# Patient Record
Sex: FEMALE | HISPANIC OR LATINO | URBAN - METROPOLITAN AREA
[De-identification: names, ages, dates, MRNs, and addresses within clinical notes are randomized per-mention and may not be internally consistent; named-entity substitution may affect disease eponyms.]

---

## 2018-07-12 ENCOUNTER — OFFICE VISIT (OUTPATIENT)
Dept: URBAN - METROPOLITAN AREA CLINIC 11 | Facility: CLINIC | Age: 68
End: 2018-07-12
Payer: COMMERCIAL

## 2018-07-12 PROCEDURE — 99214 OFFICE O/P EST MOD 30 MIN: CPT | Performed by: OPHTHALMOLOGY

## 2018-07-12 RX ORDER — PREDNISOLONE ACETATE 10 MG/ML
1 % SUSPENSION/ DROPS OPHTHALMIC
Qty: 1 | Refills: 0 | Status: INACTIVE
Start: 2018-07-12 | End: 2018-08-22

## 2018-07-12 ASSESSMENT — INTRAOCULAR PRESSURE
OS: 12
OD: 28

## 2018-07-12 NOTE — IMPRESSION/PLAN
Impression: Diagnosis: Primary open-angle glaucoma, bilateral, severe stage. Code: O70.2562. 4/18 OCT 82 (6), 2/18 HVF OD EIA/Sup dep Plan: IOP elevated OD today. IOP goal OD 20 or lower. Recommend pt continue Diamox 250mg BID po. brimonidine OD TID, lumigan QHS OD and Dorzolamide TID OD. Discussed the option of the Micropulse Photocoagulation procedure to help aid w/ Glaucoma. Recommend Micropulse CPC OD. R/B/A of procedure discussed. Pt desires to proceed with procedure.

## 2018-08-22 ENCOUNTER — SURGERY (OUTPATIENT)
Dept: URBAN - METROPOLITAN AREA SURGERY 20 | Facility: SURGERY | Age: 68
End: 2018-08-22
Payer: COMMERCIAL

## 2018-08-22 PROCEDURE — 66710 CILIARY TRANSSLERAL THERAPY: CPT | Performed by: OPHTHALMOLOGY

## 2018-08-22 RX ORDER — PREDNISOLONE ACETATE 10 MG/ML
1 % SUSPENSION/ DROPS OPHTHALMIC
Qty: 1 | Refills: 0 | Status: INACTIVE
Start: 2018-08-22 | End: 2018-08-29

## 2018-08-23 ENCOUNTER — POST-OPERATIVE VISIT (OUTPATIENT)
Dept: URBAN - METROPOLITAN AREA CLINIC 11 | Facility: CLINIC | Age: 68
End: 2018-08-23

## 2018-08-23 PROCEDURE — 99024 POSTOP FOLLOW-UP VISIT: CPT | Performed by: OPTOMETRIST

## 2018-08-23 ASSESSMENT — INTRAOCULAR PRESSURE: OD: 16

## 2018-08-29 ENCOUNTER — POST-OPERATIVE VISIT (OUTPATIENT)
Dept: URBAN - METROPOLITAN AREA CLINIC 11 | Facility: CLINIC | Age: 68
End: 2018-08-29

## 2018-08-29 PROCEDURE — 99024 POSTOP FOLLOW-UP VISIT: CPT | Performed by: OPHTHALMOLOGY

## 2018-08-29 ASSESSMENT — INTRAOCULAR PRESSURE: OD: 16

## 2018-09-26 ENCOUNTER — POST-OPERATIVE VISIT (OUTPATIENT)
Dept: URBAN - METROPOLITAN AREA CLINIC 11 | Facility: CLINIC | Age: 68
End: 2018-09-26

## 2018-09-26 PROCEDURE — 99024 POSTOP FOLLOW-UP VISIT: CPT | Performed by: OPHTHALMOLOGY

## 2018-09-26 RX ORDER — BRIMONIDINE TARTRATE 2 MG/ML
0.2 % SOLUTION/ DROPS OPHTHALMIC
Qty: 15 | Refills: 3 | Status: INACTIVE
Start: 2018-09-26 | End: 2019-05-13

## 2018-09-26 ASSESSMENT — INTRAOCULAR PRESSURE: OD: 14

## 2018-10-26 ENCOUNTER — POST-OPERATIVE VISIT (OUTPATIENT)
Dept: URBAN - METROPOLITAN AREA CLINIC 11 | Facility: CLINIC | Age: 68
End: 2018-10-26
Payer: COMMERCIAL

## 2018-10-26 PROCEDURE — 99024 POSTOP FOLLOW-UP VISIT: CPT | Performed by: OPHTHALMOLOGY

## 2018-10-26 ASSESSMENT — INTRAOCULAR PRESSURE: OD: 19

## 2018-11-13 ENCOUNTER — POST-OPERATIVE VISIT (OUTPATIENT)
Dept: URBAN - METROPOLITAN AREA CLINIC 11 | Facility: CLINIC | Age: 68
End: 2018-11-13

## 2018-11-13 PROCEDURE — 99024 POSTOP FOLLOW-UP VISIT: CPT | Performed by: OPHTHALMOLOGY

## 2018-11-13 ASSESSMENT — INTRAOCULAR PRESSURE: OD: 18

## 2019-02-15 ENCOUNTER — OFFICE VISIT (OUTPATIENT)
Dept: URBAN - METROPOLITAN AREA CLINIC 11 | Facility: CLINIC | Age: 69
End: 2019-02-15
Payer: COMMERCIAL

## 2019-02-15 PROCEDURE — 92012 INTRM OPH EXAM EST PATIENT: CPT | Performed by: OPHTHALMOLOGY

## 2019-02-15 RX ORDER — PREDNISOLONE ACETATE 10 MG/ML
1 % SUSPENSION/ DROPS OPHTHALMIC
Qty: 1 | Refills: 0 | Status: INACTIVE
Start: 2019-02-15 | End: 2020-02-27

## 2019-02-15 ASSESSMENT — INTRAOCULAR PRESSURE: OD: 20

## 2019-02-15 NOTE — IMPRESSION/PLAN
Impression: Primary iridocyclitis, right eye: H20.011. Plan: Discussed diagnosis in detail with patient. Advised patient of condition. Will start Prednisolone BID OD (medication sent to pharmacy) Will monitor closely. 2-3 week follow up with  [facility].

## 2019-02-15 NOTE — IMPRESSION/PLAN
Impression: Diagnosis: Primary open-angle glaucoma, bilateral, severe stage. Code: J97.9326. Goal IOP below 20. 4/18 OCT 82 (6), 2/18 HVF OD EIA/Sup dep Plan: IOP elevated OD. Recommend pt continue Diamox 250mg QD PO. Brimonidine TID OD, Lumigan QHS OD and Dorzolamide TID OD. Will continue to monitor. IOP check in 2 to 3 weeks.

## 2019-03-05 ENCOUNTER — OFFICE VISIT (OUTPATIENT)
Dept: URBAN - METROPOLITAN AREA CLINIC 11 | Facility: CLINIC | Age: 69
End: 2019-03-05
Payer: COMMERCIAL

## 2019-03-05 DIAGNOSIS — H40.1133 PRIMARY OPEN-ANGLE GLAUCOMA, BILATERAL, SEVERE STAGE: Primary | ICD-10-CM

## 2019-03-05 PROCEDURE — 99213 OFFICE O/P EST LOW 20 MIN: CPT | Performed by: OPHTHALMOLOGY

## 2019-03-05 ASSESSMENT — INTRAOCULAR PRESSURE
OS: 8
OD: 28

## 2019-03-05 NOTE — IMPRESSION/PLAN
Impression: Diagnosis: Primary open-angle glaucoma, bilateral, severe stage. Code: G79.5151. Goal IOP below 20. 4/18 OCT 82 (6), 2/18 HVF OD EIA/Sup dep Plan: IOP elevated OD. Recommend pt continue Diamox 250mg QD PO. Brimonidine TID OD, Lumigan QHS OD and Dorzolamide TID OD. Add Rhopressa QHS OD (samples dispensed to patient) Will monitor IOP closely. Discussed repeat Micropulse OD if IOP remains elevated with additional medication. 3-4 week IOP check with Dr Nahun Rod.

## 2019-03-05 NOTE — IMPRESSION/PLAN
Impression: Primary iridocyclitis, right eye: H20.011. Plan: Resolved. D/C Prednisolone. Discussed that Pred may be causing elevation in IOP.

## 2019-04-02 ENCOUNTER — OFFICE VISIT (OUTPATIENT)
Dept: URBAN - METROPOLITAN AREA CLINIC 11 | Facility: CLINIC | Age: 69
End: 2019-04-02
Payer: COMMERCIAL

## 2019-04-02 PROCEDURE — 99213 OFFICE O/P EST LOW 20 MIN: CPT | Performed by: OPHTHALMOLOGY

## 2019-04-02 ASSESSMENT — INTRAOCULAR PRESSURE: OD: 16

## 2019-04-02 NOTE — IMPRESSION/PLAN
Impression: Diagnosis: Primary open-angle glaucoma, bilateral, severe stage. Code: U37.9317. Goal IOP below 20. 4/18 OCT 82 (6), 2/18 HVF OD EIA/Sup dep Plan: IOP improved OD. Recommend pt continue Diamox 250mg QD PO. Brimonidine TID OD, Lumigan QHS OD, Dorzolamide TID OD and Rhopressa QHS OD (medication sent to pharmacy) Will monitor IOP closely. 2 month HVF, RNFL OCT and DE with Dr Cuba Aranda.

## 2019-06-12 ENCOUNTER — TESTING ONLY (OUTPATIENT)
Dept: URBAN - METROPOLITAN AREA CLINIC 11 | Facility: CLINIC | Age: 69
End: 2019-06-12
Payer: COMMERCIAL

## 2019-06-12 PROCEDURE — 92083 EXTENDED VISUAL FIELD XM: CPT | Performed by: OPHTHALMOLOGY

## 2019-07-03 ENCOUNTER — OFFICE VISIT (OUTPATIENT)
Dept: URBAN - METROPOLITAN AREA CLINIC 11 | Facility: CLINIC | Age: 69
End: 2019-07-03
Payer: COMMERCIAL

## 2019-07-03 PROCEDURE — 92014 COMPRE OPH EXAM EST PT 1/>: CPT | Performed by: OPHTHALMOLOGY

## 2019-07-03 PROCEDURE — 92133 CPTRZD OPH DX IMG PST SGM ON: CPT | Performed by: OPHTHALMOLOGY

## 2019-07-03 ASSESSMENT — INTRAOCULAR PRESSURE
OD: 22
OS: 12

## 2019-07-03 NOTE — IMPRESSION/PLAN
Impression: Diagnosis: Primary open-angle glaucoma, bilateral, severe stage. Code: X64.7178. Goal IOP below 20. 4/18 OCT 82 (6), 6/19 HVF OD MANPREET/SD Plan: IOP elevated OD. Discussed treatment options. Due to HVF changes, and monocular patient, will change medication. Recommend patient increase Diamox 250mg to BID PO. D/C  Brimonidine, Lumigan, Dorzolamide  and Rhopressa. Start Simbrinza TID OD and Rocklatan QHS OD (samples dispensed to patient) Discussed repeating Micropulse OD as next step. Patient voiced understanding. 1 month IOP check Dr Adriana Christensen.

## 2019-08-14 ENCOUNTER — OFFICE VISIT (OUTPATIENT)
Dept: URBAN - METROPOLITAN AREA CLINIC 11 | Facility: CLINIC | Age: 69
End: 2019-08-14
Payer: COMMERCIAL

## 2019-08-14 PROCEDURE — 99212 OFFICE O/P EST SF 10 MIN: CPT | Performed by: OPHTHALMOLOGY

## 2019-08-14 RX ORDER — PREDNISOLONE ACETATE 10 MG/ML
1 % SUSPENSION/ DROPS OPHTHALMIC
Qty: 1 | Refills: 0 | Status: INACTIVE
Start: 2019-08-14 | End: 2019-08-29

## 2019-08-14 ASSESSMENT — INTRAOCULAR PRESSURE
OS: 8
OD: 25

## 2019-08-14 NOTE — IMPRESSION/PLAN
Impression: Diagnosis: Primary open-angle glaucoma, bilateral, severe stage. Code: J06.0791. Goal IOP below 20. 4/18 OCT 82 (6), 6/19 HVF OD MANPREET/SD Plan: IOP remains elevated OD. Discussed treatment options. Recommend patient continue Diamox 250mg BID PO, Simbrinza TID OD and Rocklatan QHS OD (samples dispensed to patient)  Discussed the option of the Micropulse G6 Laser procedure to help aid w/ Glaucoma. Recommend Micropulse cytophotocoagulation OD R/B/A of procedure discussed. Pt desires to proceed with procedure.

## 2019-09-18 ENCOUNTER — SURGERY (OUTPATIENT)
Dept: URBAN - METROPOLITAN AREA SURGERY 20 | Facility: SURGERY | Age: 69
End: 2019-09-18
Payer: COMMERCIAL

## 2019-09-18 PROCEDURE — 66710 CILIARY TRANSSLERAL THERAPY: CPT | Performed by: OPHTHALMOLOGY

## 2019-09-19 ENCOUNTER — POST-OPERATIVE VISIT (OUTPATIENT)
Dept: URBAN - METROPOLITAN AREA CLINIC 11 | Facility: CLINIC | Age: 69
End: 2019-09-19

## 2019-09-19 DIAGNOSIS — Z09 ENCNTR FOR F/U EXAM AFT TRTMT FOR COND OTH THAN MALIG NEOPLM: Primary | ICD-10-CM

## 2019-09-19 PROCEDURE — 99024 POSTOP FOLLOW-UP VISIT: CPT | Performed by: OPTOMETRIST

## 2019-09-19 ASSESSMENT — INTRAOCULAR PRESSURE: OD: 12

## 2019-09-25 ENCOUNTER — POST-OPERATIVE VISIT (OUTPATIENT)
Dept: URBAN - METROPOLITAN AREA CLINIC 11 | Facility: CLINIC | Age: 69
End: 2019-09-25

## 2019-09-25 ASSESSMENT — INTRAOCULAR PRESSURE: OD: 17

## 2019-10-23 ENCOUNTER — POST-OPERATIVE VISIT (OUTPATIENT)
Dept: URBAN - METROPOLITAN AREA CLINIC 11 | Facility: CLINIC | Age: 69
End: 2019-10-23

## 2019-10-23 ASSESSMENT — INTRAOCULAR PRESSURE: OD: 13

## 2019-11-12 ENCOUNTER — POST-OPERATIVE VISIT (OUTPATIENT)
Dept: URBAN - METROPOLITAN AREA CLINIC 11 | Facility: CLINIC | Age: 69
End: 2019-11-12

## 2019-11-12 RX ORDER — PREDNISOLONE ACETATE 10 MG/ML
1 % SUSPENSION/ DROPS OPHTHALMIC
Qty: 1 | Refills: 1 | Status: INACTIVE
Start: 2019-11-12 | End: 2020-08-03

## 2019-11-12 ASSESSMENT — INTRAOCULAR PRESSURE: OD: 14

## 2020-02-12 ENCOUNTER — OFFICE VISIT (OUTPATIENT)
Dept: URBAN - METROPOLITAN AREA CLINIC 11 | Facility: CLINIC | Age: 70
End: 2020-02-12
Payer: COMMERCIAL

## 2020-02-12 PROCEDURE — 99213 OFFICE O/P EST LOW 20 MIN: CPT | Performed by: OPHTHALMOLOGY

## 2020-02-12 ASSESSMENT — INTRAOCULAR PRESSURE
OS: 8
OD: 15

## 2020-02-12 NOTE — IMPRESSION/PLAN
Impression: Diagnosis: Primary open-angle glaucoma, bilateral, severe stage. Code: T25.5595. Goal IOP below 20. 4/18 OCT 82 (6), 6/19 HVF OD MANPREET/SD
S/P Micropulse OD Plan: Discussed diagnosis with patient. Recommend patient continue Diamox 250mg QD PO, Dorzolamide TID OD, Brimonidine TID OD and Rocklatan QHS OD. Will continue to monitor.  4 month HVF/RNFL OCT and DE

## 2020-07-21 ENCOUNTER — OFFICE VISIT (OUTPATIENT)
Dept: URBAN - METROPOLITAN AREA CLINIC 11 | Facility: CLINIC | Age: 70
End: 2020-07-21
Payer: MEDICARE

## 2020-07-21 PROCEDURE — 92014 COMPRE OPH EXAM EST PT 1/>: CPT | Performed by: OPHTHALMOLOGY

## 2020-07-21 PROCEDURE — 92133 CPTRZD OPH DX IMG PST SGM ON: CPT | Performed by: OPHTHALMOLOGY

## 2020-07-21 PROCEDURE — 92083 EXTENDED VISUAL FIELD XM: CPT | Performed by: OPHTHALMOLOGY

## 2020-07-21 ASSESSMENT — INTRAOCULAR PRESSURE: OD: 25

## 2020-07-21 NOTE — IMPRESSION/PLAN
Impression: Diagnosis: Primary open-angle glaucoma, bilateral, severe stage. Code: N94.2345. Goal IOP below 20. 7/20 OCT 78 (7), 7/20  HVF OD [place]
S/P Micropulse OD Plan: Discussed diagnosis with patient. HVF and RNFL OCT reviewed with patient. IOP elevated OD, discussed treatment options. Recommend patient continue Diamox 250mg QD PO, Dorzolamide TID OD, Brimonidine TID OD and Rocklatan QHS OD. Discussed the option of the Micropulse G6 Laser procedure to help aid w/ Glaucoma. Recommend Micropulse cytophotocoagulation. R/B/A of procedure discussed. ok for Micropulse G6 OD in ASC, RL1. Pt desires to proceed with procedure.

## 2020-08-10 ENCOUNTER — OFFICE VISIT (OUTPATIENT)
Dept: URBAN - METROPOLITAN AREA CLINIC 11 | Facility: CLINIC | Age: 70
End: 2020-08-10
Payer: MEDICARE

## 2020-08-10 PROCEDURE — 92012 INTRM OPH EXAM EST PATIENT: CPT | Performed by: OPTOMETRIST

## 2020-08-10 ASSESSMENT — INTRAOCULAR PRESSURE
OD: 34
OD: 37

## 2020-08-10 NOTE — IMPRESSION/PLAN
Impression: Diagnosis: Primary open-angle glaucoma, bilateral, severe stage. Code: G97.8331. Goal IOP below 20. 7/20 OCT 78 (7), 7/20  HVF OD [place]
S/P Micropulse OD Plan: IOP elevated OD today. Recommend patient to Increase Diamox 250mg 2 tab BID PO. Continue Dorzolamide TID OD, Brimonidine TID OD and Rocklatan QHS OD (sample dispensed today).  f/u 2 days IOP Check with Dr. Bernardo Calzada

## 2020-08-12 ENCOUNTER — OFFICE VISIT (OUTPATIENT)
Dept: URBAN - METROPOLITAN AREA CLINIC 11 | Facility: CLINIC | Age: 70
End: 2020-08-12
Payer: MEDICARE

## 2020-08-12 PROCEDURE — 99213 OFFICE O/P EST LOW 20 MIN: CPT | Performed by: OPHTHALMOLOGY

## 2020-08-12 RX ORDER — NETARSUDIL AND LATANOPROST OPHTHALMIC SOLUTION, 0.02%/0.005% .2; .05 MG/ML; MG/ML
SOLUTION/ DROPS OPHTHALMIC; TOPICAL
Qty: 1 | Refills: 11 | Status: INACTIVE
Start: 2020-08-12 | End: 2021-04-07

## 2020-08-12 ASSESSMENT — INTRAOCULAR PRESSURE: OD: 12

## 2020-08-12 NOTE — IMPRESSION/PLAN
Impression: Diagnosis: Primary open-angle glaucoma, bilateral, severe stage. Code: O75.7862. Goal IOP below 20. 7/20 OCT 78 (7), 7/20  HVF OD [place]
S/P Micropulse OD Plan: Discussed diagnosis with patient. IOP improved today. Recommend patient continue Diamox 500mg BID PO Dorzolamide TID OD, Brimonidine TID OD and Rocklatan QHS OD (sent to [address] [place] [address]). 1 month IOP check.

## 2020-09-09 ENCOUNTER — OFFICE VISIT (OUTPATIENT)
Dept: URBAN - METROPOLITAN AREA CLINIC 11 | Facility: CLINIC | Age: 70
End: 2020-09-09
Payer: MEDICARE

## 2020-09-09 PROCEDURE — 99213 OFFICE O/P EST LOW 20 MIN: CPT | Performed by: OPHTHALMOLOGY

## 2020-09-09 ASSESSMENT — INTRAOCULAR PRESSURE: OD: 12

## 2020-09-09 NOTE — IMPRESSION/PLAN
Impression: Diagnosis: Primary open-angle glaucoma, bilateral, severe stage. Code: B14.8985. Goal IOP below 20. 7/20 OCT 78 (7), 7/20  HVF OD [place]
S/P Micropulse OD Plan: Discussed diagnosis with patient. IOP stable. Recommend patient continue Diamox 500mg BID PO Dorzolamide TID OD, Brimonidine TID OD and Rocklatan QHS OD. 3 month IOP check.

## 2020-12-15 ENCOUNTER — OFFICE VISIT (OUTPATIENT)
Dept: URBAN - METROPOLITAN AREA CLINIC 45 | Facility: CLINIC | Age: 70
End: 2020-12-15
Payer: MEDICARE

## 2020-12-15 PROCEDURE — 99213 OFFICE O/P EST LOW 20 MIN: CPT | Performed by: OPHTHALMOLOGY

## 2020-12-15 ASSESSMENT — INTRAOCULAR PRESSURE: OD: 12

## 2020-12-15 NOTE — IMPRESSION/PLAN
Impression: Diagnosis: Primary open-angle glaucoma, bilateral, severe stage. Code: S97.2157. Goal IOP below 20. 7/20 OCT 78 (7), 7/20  HVF OD [place]
S/P Micropulse OD Plan: Discussed diagnosis with patient. IOP stable. Recommend patient continue Diamox 500mg BID PO Dorzolamide TID OD, Brimonidine TID OD and Rocklatan QHS OD (samples dispensed to patient) 3 month IOP check.

## 2021-03-30 ENCOUNTER — OFFICE VISIT (OUTPATIENT)
Dept: URBAN - METROPOLITAN AREA CLINIC 11 | Facility: CLINIC | Age: 71
End: 2021-03-30
Payer: MEDICARE

## 2021-03-30 PROCEDURE — 99213 OFFICE O/P EST LOW 20 MIN: CPT | Performed by: OPHTHALMOLOGY

## 2021-03-30 ASSESSMENT — INTRAOCULAR PRESSURE: OD: 12

## 2021-07-28 ENCOUNTER — OFFICE VISIT (OUTPATIENT)
Dept: URBAN - METROPOLITAN AREA CLINIC 11 | Facility: CLINIC | Age: 71
End: 2021-07-28
Payer: MEDICARE

## 2021-07-28 DIAGNOSIS — H20.011 PRIMARY IRIDOCYCLITIS, RIGHT EYE: ICD-10-CM

## 2021-07-28 PROCEDURE — 99213 OFFICE O/P EST LOW 20 MIN: CPT | Performed by: OPHTHALMOLOGY

## 2021-07-28 ASSESSMENT — INTRAOCULAR PRESSURE: OD: 9

## 2021-07-28 NOTE — IMPRESSION/PLAN
Impression: Primary iridocyclitis, right eye: H20.011. Plan: Discussed diagnosis in detail with patient. Advised patient of condition. Discussed treatment options with patient. Start Lotemax QD OD (sample dispensed to patient) Will monitor closely. 2-3 week follow up.

## 2021-07-28 NOTE — IMPRESSION/PLAN
Impression: Diagnosis: Primary open-angle glaucoma, bilateral, severe stage. Code: V39.1555. Goal IOP below 20. 7/20 OCT 78 (7), 7/20  HVF OD [place]
S/P Micropulse OD Plan: Discussed diagnosis with patient. IOP stable. Recommend patient continue Diamox 500mg BID PO, Dorzolamide TID OD, Brimonidine TID OD and Rocklatan QHS OD. HVF and RNFL OCT at next visit.

## 2021-08-03 ENCOUNTER — TESTING ONLY (OUTPATIENT)
Dept: URBAN - METROPOLITAN AREA CLINIC 11 | Facility: CLINIC | Age: 71
End: 2021-08-03
Payer: MEDICARE

## 2021-08-03 PROCEDURE — 92083 EXTENDED VISUAL FIELD XM: CPT | Performed by: OPHTHALMOLOGY

## 2021-08-17 ENCOUNTER — OFFICE VISIT (OUTPATIENT)
Dept: URBAN - METROPOLITAN AREA CLINIC 11 | Facility: CLINIC | Age: 71
End: 2021-08-17
Payer: MEDICARE

## 2021-08-17 PROCEDURE — 92133 CPTRZD OPH DX IMG PST SGM ON: CPT | Performed by: OPHTHALMOLOGY

## 2021-08-17 PROCEDURE — 99213 OFFICE O/P EST LOW 20 MIN: CPT | Performed by: OPHTHALMOLOGY

## 2021-08-17 ASSESSMENT — INTRAOCULAR PRESSURE
OD: 16
OS: 10

## 2021-08-17 NOTE — IMPRESSION/PLAN
Impression: Primary iridocyclitis, right eye: H20.011. Plan: Discussed diagnosis in detail with patient. Advised patient of condition. Improving. Recommend patient continue Lotemax QD OD until bottle runs out.

## 2021-08-17 NOTE — IMPRESSION/PLAN
Impression: Diagnosis: Primary open-angle glaucoma, bilateral, severe stage. Code: R81.0310. Goal IOP below 20. 8/21 OCT 86/- 5/-, 8/21 HVF OD S/IA 
S/P Micropulse OD Plan: Discussed diagnosis with patient. HVF and RNFL OCT reviewed with patient. Recommend patient continue Diamox 500mg BID PO, Dorzolamide TID OD, Brimonidine TID OD and Rocklatan QHS OD. 3 month IOP check.

## 2021-11-17 ENCOUNTER — OFFICE VISIT (OUTPATIENT)
Dept: URBAN - METROPOLITAN AREA CLINIC 11 | Facility: CLINIC | Age: 71
End: 2021-11-17
Payer: MEDICARE

## 2021-11-17 PROCEDURE — 99213 OFFICE O/P EST LOW 20 MIN: CPT | Performed by: OPHTHALMOLOGY

## 2021-11-17 RX ORDER — NEOMYCIN, POLYMYXIN B SULFATES, DEXAMETHASONE 1; 3.5; 1 MG/G; MG/G; [USP'U]/G
OINTMENT OPHTHALMIC
Qty: 1 | Refills: 1 | Status: INACTIVE
Start: 2021-11-17 | End: 2022-01-04

## 2021-11-17 ASSESSMENT — INTRAOCULAR PRESSURE: OD: 17

## 2021-11-17 NOTE — IMPRESSION/PLAN
Impression: Diagnosis: Primary open-angle glaucoma, bilateral, severe stage. Code: Q59.9539. Goal IOP below 20. 8/21 OCT 86/- 5/-, 8/21 HVF OD S/IA 
S/P Micropulse OD Plan: Discussed diagnosis with patient. IOP stable. Recommend patient continue Diamox 500mg BID PO, Dorzolamide TID OD, Brimonidine TID OD and Rocklatan QHS OD. Will continue to monitor.

## 2021-11-17 NOTE — IMPRESSION/PLAN
Impression: Primary iridocyclitis, right eye: H20.011. Plan: Discussed diagnosis in detail with patient. Advised patient of condition. Start Johnnie/poly/dex TOMAS TID OD (sent medication to pharmacy) dispensed sample of gtts for patient to start if TOMAS is to expensive. Will continue to monitor closely. 2 week follow up.

## 2021-12-07 ENCOUNTER — OFFICE VISIT (OUTPATIENT)
Dept: URBAN - METROPOLITAN AREA CLINIC 11 | Facility: CLINIC | Age: 71
End: 2021-12-07
Payer: MEDICARE

## 2021-12-07 PROCEDURE — 99213 OFFICE O/P EST LOW 20 MIN: CPT | Performed by: OPHTHALMOLOGY

## 2021-12-07 ASSESSMENT — INTRAOCULAR PRESSURE: OD: 12

## 2021-12-07 NOTE — IMPRESSION/PLAN
Impression: Primary iridocyclitis, right eye: H20.011. Improving Plan: Discussed diagnosis in detail with patient. Condition improving. Recommend patient continue Johnnie/poly/dex TOMAS QHS OD and Johnnie/poly/dex gtts QD OD. Will continue to monitor closely. 1 month follow up.

## 2021-12-07 NOTE — IMPRESSION/PLAN
Impression: Diagnosis: Primary open-angle glaucoma, bilateral, severe stage. Code: T75.7370. Goal IOP below 20. 8/21 OCT 86/- 5/-, 8/21 HVF OD S/IA 
S/P Micropulse OD Plan: Discussed diagnosis with patient. IOP stable. Recommend patient continue Diamox 500mg BID PO, Dorzolamide TID OD, Brimonidine TID OD and Rocklatan QHS OD. Will continue to monitor. Patient brought in appeal letter, if unable to obtain approval ok to split Rocklatan (Rhopressa/Latanoprost) Will continue to monitor. 1 month IOP check.

## 2022-01-04 ENCOUNTER — OFFICE VISIT (OUTPATIENT)
Dept: URBAN - METROPOLITAN AREA CLINIC 11 | Facility: CLINIC | Age: 72
End: 2022-01-04
Payer: MEDICARE

## 2022-01-04 PROCEDURE — 99213 OFFICE O/P EST LOW 20 MIN: CPT | Performed by: OPHTHALMOLOGY

## 2022-01-04 RX ORDER — NEOMYCIN, POLYMYXIN B SULFATES, DEXAMETHASONE 1; 3.5; 1 MG/G; MG/G; [USP'U]/G
OINTMENT OPHTHALMIC
Qty: 1 | Refills: 5 | Status: ACTIVE
Start: 2022-01-04

## 2022-01-04 ASSESSMENT — INTRAOCULAR PRESSURE: OD: 16

## 2022-01-04 NOTE — IMPRESSION/PLAN
Impression: Diagnosis: Primary open-angle glaucoma, bilateral, severe stage. Code: M05.1149. Goal IOP below 20. 8/21 OCT 86/- 5/-, 8/21 HVF OD S/IA 
S/P Micropulse OD Plan: Discussed diagnosis with patient. IOP stable.  Recommend patient continue Diamox 500mg BID PO, Dorzolamide TID OD, Brimonidine TID OD and stop Rocklatan QHS OD due to insurance and start latanoprost QHS OD.

## 2022-01-04 NOTE — IMPRESSION/PLAN
Impression: Primary iridocyclitis, right eye: H20.011. Improving Plan: Discussed diagnosis in detail with patient. Condition resolved. Recommend patient continue Johnnie/poly/dex TOMAS QHS OD and stop  Johnnie/poly/dex gtts QD OD. Will continue to monitor closely. 2 month follow up.

## 2022-03-01 ENCOUNTER — OFFICE VISIT (OUTPATIENT)
Dept: URBAN - METROPOLITAN AREA CLINIC 11 | Facility: CLINIC | Age: 72
End: 2022-03-01
Payer: MEDICARE

## 2022-03-01 PROCEDURE — 99213 OFFICE O/P EST LOW 20 MIN: CPT | Performed by: OPHTHALMOLOGY

## 2022-03-01 ASSESSMENT — INTRAOCULAR PRESSURE: OD: 21

## 2022-03-01 NOTE — IMPRESSION/PLAN
Impression: Diagnosis: Primary open-angle glaucoma, bilateral, severe stage. Code: F91.2891. Goal IOP below 20. 8/21 OCT 86/- 5/-, 8/21 HVF OD S/IA 
S/P Micropulse OD Plan: Discussed diagnosis with patient. IOP stable. Recommend patient continue Diamox 500mg BID PO, Dorzolamide TID OD, Brimonidine TID OD and Latanoprost QHS OD. Will continue to monitor.  4 month HVF/DE and RNFL OCT

## 2022-03-01 NOTE — IMPRESSION/PLAN
Impression: Primary iridocyclitis, right eye: H20.011. Improving Plan: Discussed diagnosis in detail with patient. Condition resolved. Recommend patient d/c Johnnie/poly/dex. Will monitor.

## 2022-07-12 ENCOUNTER — TESTING ONLY (OUTPATIENT)
Facility: LOCATION | Age: 72
End: 2022-07-12
Payer: MEDICARE

## 2022-07-12 DIAGNOSIS — H40.1133 PRIMARY OPEN-ANGLE GLAUCOMA, BILATERAL, SEVERE STAGE: Primary | ICD-10-CM

## 2022-07-12 PROCEDURE — 92083 EXTENDED VISUAL FIELD XM: CPT | Performed by: OPHTHALMOLOGY

## 2022-07-13 ENCOUNTER — OFFICE VISIT (OUTPATIENT)
Facility: LOCATION | Age: 72
End: 2022-07-13
Payer: MEDICARE

## 2022-07-13 DIAGNOSIS — H20.011 PRIMARY IRIDOCYCLITIS, RIGHT EYE: ICD-10-CM

## 2022-07-13 DIAGNOSIS — H02.102 UNSPECIFIED ECTROPION OF RIGHT LOWER EYELID: ICD-10-CM

## 2022-07-13 PROCEDURE — 99214 OFFICE O/P EST MOD 30 MIN: CPT | Performed by: OPHTHALMOLOGY

## 2022-07-13 ASSESSMENT — INTRAOCULAR PRESSURE: OD: 19

## 2022-07-13 NOTE — IMPRESSION/PLAN
Impression: Diagnosis: Primary open-angle glaucoma, bilateral, severe stage. Code: W92.8272. Goal IOP below 20. 7/22 OCT 73/- 4/-, 7/22 HVF OD [place]/SD 
S/P Micropulse OD Plan: Discussed diagnosis with patient. HVF reviewed with patient. Recommend patient continue Diamox 500mg BID PO, Dorzolamide TID OD, Brimonidine TID OD and Latanoprost QHS OD.  Will continue to monitor

## 2022-07-13 NOTE — IMPRESSION/PLAN
Impression: Primary iridocyclitis, right eye: H20.011. Plan: Discussed diagnosis in detail with patient. Start Lotemax TID OD (samples dispensed to patient). Will monitor closely. 1 week follow up.

## 2022-07-13 NOTE — IMPRESSION/PLAN
Impression: Unspecified ectropion of right lower eyelid: H02.102. Plan: Discussed diagnosis with patient. Facial droop in R side. Recommend evaluation today with PCP for CVA, if unable recommend visit to ER today.  Patient voiced understanding

## 2022-07-19 ENCOUNTER — OFFICE VISIT (OUTPATIENT)
Facility: LOCATION | Age: 72
End: 2022-07-19
Payer: MEDICARE

## 2022-07-19 DIAGNOSIS — H20.011 PRIMARY IRIDOCYCLITIS, RIGHT EYE: ICD-10-CM

## 2022-07-19 DIAGNOSIS — H40.1133 PRIMARY OPEN-ANGLE GLAUCOMA, BILATERAL, SEVERE STAGE: Primary | ICD-10-CM

## 2022-07-19 PROCEDURE — 99213 OFFICE O/P EST LOW 20 MIN: CPT | Performed by: OPHTHALMOLOGY

## 2022-07-19 RX ORDER — PREDNISOLONE ACETATE 10 MG/ML
1 % SUSPENSION/ DROPS OPHTHALMIC
Qty: 10 | Refills: 3 | Status: ACTIVE
Start: 2022-07-19

## 2022-07-19 ASSESSMENT — INTRAOCULAR PRESSURE: OD: 18

## 2022-07-19 NOTE — IMPRESSION/PLAN
Impression: Primary iridocyclitis, right eye: H20.011. Plan: Discussed diagnosis in detail with patient. Decrease Lotemax to BID OD (sample dispensed) Use until bottle runs out, then start Prednisolone BID OD (medication sent to pharmacy) Will continue to monitor. 1 month follow up.

## 2022-07-19 NOTE — IMPRESSION/PLAN
Impression: Diagnosis: Primary open-angle glaucoma, bilateral, severe stage. Code: K83.5329. Goal IOP below 20. 7/22 OCT 73/- 4/-, 7/22 HVF OD [place]/SD 
S/P Micropulse OD Plan: Discussed diagnosis with patient. Recommend patient continue Diamox 500mg BID PO, Dorzolamide TID OD, Brimonidine TID OD and Latanoprost QHS OD. Will continue to monitor.

## 2022-08-29 ENCOUNTER — OFFICE VISIT (OUTPATIENT)
Facility: LOCATION | Age: 72
End: 2022-08-29
Payer: MEDICARE

## 2022-08-29 DIAGNOSIS — H40.1133 PRIMARY OPEN-ANGLE GLAUCOMA, BILATERAL, SEVERE STAGE: Primary | ICD-10-CM

## 2022-08-29 DIAGNOSIS — H20.011 PRIMARY IRIDOCYCLITIS, RIGHT EYE: ICD-10-CM

## 2022-08-29 DIAGNOSIS — G45.3 AMAUROSIS FUGAX: ICD-10-CM

## 2022-08-29 PROCEDURE — 99213 OFFICE O/P EST LOW 20 MIN: CPT | Performed by: OPHTHALMOLOGY

## 2022-08-29 ASSESSMENT — INTRAOCULAR PRESSURE: OD: 20

## 2022-08-29 NOTE — IMPRESSION/PLAN
Impression: Primary iridocyclitis, right eye: H20.011. Plan: Discussed diagnosis in detail with patient. Continue Prednisolone BID OD. Will continue to monitor. 1 month follow up.

## 2022-08-29 NOTE — IMPRESSION/PLAN
Impression: Amaurosis fugax: G45.3. Plan: Discussed diagnosis in detail with patient. Advised patient of condition. Recommend 2D Echo and Carotid ultrasound.  Note to PCP

## 2022-09-20 ENCOUNTER — OFFICE VISIT (OUTPATIENT)
Dept: URBAN - METROPOLITAN AREA CLINIC 32 | Facility: CLINIC | Age: 72
End: 2022-09-20
Payer: MEDICARE

## 2022-09-20 DIAGNOSIS — H52.4 PRESBYOPIA: ICD-10-CM

## 2022-09-20 DIAGNOSIS — H40.1133 PRIMARY OPEN-ANGLE GLAUCOMA, BILATERAL, SEVERE STAGE: Primary | ICD-10-CM

## 2022-09-20 PROCEDURE — 99214 OFFICE O/P EST MOD 30 MIN: CPT | Performed by: OPTOMETRIST

## 2022-09-20 ASSESSMENT — VISUAL ACUITY
OD: 20/40
OS: NLP

## 2022-09-20 ASSESSMENT — INTRAOCULAR PRESSURE: OD: 17

## 2022-09-20 NOTE — IMPRESSION/PLAN
Impression: Diagnosis: Primary open-angle glaucoma, bilateral, severe stage. Code: R39.0200. Goal IOP below 20. 7/22 OCT 73/- 4/-, 7/22 HVF OD [place] 
S/P Micropulse OD Plan: Continue with Dr Lori ambrosio add bifocal Rx Recommend: Andry 4x Hand Magnifier, Andry Smart [employer], JACI Lights(often available at [employer] or [employer]), Large TV 65'' to 82,'' Audible Books, MaxTV/Max Detail Discussed: ORCAM, iPhone/iPad, Audible Assistants([employer] Gracie/Google Home) Discussed diagnosis with patient. Recommend patient continue Diamox 500mg BID PO, Dorzolamide TID OD, Brimonidine TID OD and Latanoprost QHS OD. Will continue to monitor.

## 2022-10-11 ENCOUNTER — OFFICE VISIT (OUTPATIENT)
Facility: LOCATION | Age: 72
End: 2022-10-11
Payer: MEDICARE

## 2022-10-11 DIAGNOSIS — H20.011 PRIMARY IRIDOCYCLITIS, RIGHT EYE: ICD-10-CM

## 2022-10-11 DIAGNOSIS — H40.1133 PRIMARY OPEN-ANGLE GLAUCOMA, BILATERAL, SEVERE STAGE: Primary | ICD-10-CM

## 2022-10-11 PROCEDURE — 99213 OFFICE O/P EST LOW 20 MIN: CPT | Performed by: OPHTHALMOLOGY

## 2022-10-11 ASSESSMENT — INTRAOCULAR PRESSURE: OD: 18

## 2022-10-11 NOTE — IMPRESSION/PLAN
Impression: Diagnosis: Primary open-angle glaucoma, bilateral, severe stage. Code: E39.8383. Goal IOP below 20. 7/22 OCT 73/- 4/-, 7/22 HVF OD [place] 
S/P Micropulse OD Plan: Discussed diagnosis with patient. Recommend patient continue Diamox 500mg BID PO, Dorzolamide TID OD, Brimonidine TID OD and Latanoprost QHS OD. Will continue to monitor. 4-6 week IOP check.

## 2022-10-11 NOTE — IMPRESSION/PLAN
Impression: Primary iridocyclitis, right eye: H20.011. Plan: Discussed diagnosis in detail with patient. Decrease Prednisolone to QD OD. Will continue to monitor. 4-6 week follow up.

## 2022-11-21 ENCOUNTER — OFFICE VISIT (OUTPATIENT)
Facility: LOCATION | Age: 72
End: 2022-11-21
Payer: MEDICARE

## 2022-11-21 DIAGNOSIS — H40.1133 PRIMARY OPEN-ANGLE GLAUCOMA, BILATERAL, SEVERE STAGE: Primary | ICD-10-CM

## 2022-11-21 PROCEDURE — 99214 OFFICE O/P EST MOD 30 MIN: CPT | Performed by: OPHTHALMOLOGY

## 2022-11-21 RX ORDER — PREDNISOLONE ACETATE 10 MG/ML
1 % SUSPENSION/ DROPS OPHTHALMIC
Qty: 1 | Refills: 0 | Status: ACTIVE
Start: 2022-11-21

## 2022-11-21 RX ORDER — LATANOPROST 50 UG/ML
0.005 % SOLUTION OPHTHALMIC
Qty: 7.5 | Refills: 3 | Status: ACTIVE
Start: 2022-11-21

## 2022-11-21 ASSESSMENT — INTRAOCULAR PRESSURE
OD: 25
OD: 26

## 2022-11-21 NOTE — IMPRESSION/PLAN
Impression: Diagnosis: Primary open-angle glaucoma, bilateral, severe stage. Code: Z26.0351. Goal IOP below 20. 7/22 OCT 73/- 4/-, 7/22 HVF OD [place]/SD 
S/P Micropulse OD Plan: Discussed diagnosis with patient. IOP elevated. Discussed treatment options. Recommend patient continue Diamox 500mg BID PO, Dorzolamide TID OD, Brimonidine TID OD and Latanoprost QHS OD. Discussed the option of the Micropulse G6 Laser procedure to help aid w/ Glaucoma. Recommend Micropulse cytophotocoagulation. R/B/A of procedure discussed. ok for Micropulse G6 OD in ASC, RL1. Pt desires to proceed with procedure.

## 2023-01-13 ENCOUNTER — POST-OPERATIVE VISIT (OUTPATIENT)
Facility: LOCATION | Age: 73
End: 2023-01-13
Payer: MEDICARE

## 2023-01-13 DIAGNOSIS — Z48.810 ENCOUNTER FOR SURGICAL AFTERCARE FOLLOWING SURGERY ON A SENSE ORGAN: Primary | ICD-10-CM

## 2023-01-13 PROCEDURE — 99024 POSTOP FOLLOW-UP VISIT: CPT

## 2023-01-13 ASSESSMENT — INTRAOCULAR PRESSURE: OD: 17

## 2023-01-13 NOTE — IMPRESSION/PLAN
Impression: S/P Diode Micropulse Laser Cyclophotocoagulation OD - 1 Day. Encounter for surgical aftercare following surgery on a sense organ  Z48.810. Excellent post op course   Post operative instructions reviewed - Condition is improving - Plan: Patient presents for one day post op. Patient advised they are healing well from surgery. Patient was also advised to keep appointment in one week for follow up. Patient to call office with any increased pain or decreased vision.  --Continue Prednisolone acetate 1% QID x1 week

## 2023-01-25 ENCOUNTER — POST-OPERATIVE VISIT (OUTPATIENT)
Facility: LOCATION | Age: 73
End: 2023-01-25
Payer: MEDICARE

## 2023-01-25 DIAGNOSIS — Z48.810 ENCOUNTER FOR SURGICAL AFTERCARE FOLLOWING SURGERY ON A SENSE ORGAN: Primary | ICD-10-CM

## 2023-01-25 PROCEDURE — 99024 POSTOP FOLLOW-UP VISIT: CPT | Performed by: OPHTHALMOLOGY

## 2023-01-25 RX ORDER — PREDNISOLONE ACETATE 10 MG/ML
1 % SUSPENSION/ DROPS OPHTHALMIC
Qty: 10 | Refills: 1 | Status: ACTIVE
Start: 2023-01-25

## 2023-01-25 ASSESSMENT — INTRAOCULAR PRESSURE: OD: 15

## 2023-01-25 NOTE — IMPRESSION/PLAN
Impression: S/P Diode Micropulse Laser Cyclophotocoagulation OD - 13 Days. Encounter for surgical aftercare following surgery on a sense organ  Z48.810. Post operative instructions reviewed -medication reviewed Plan: 3 week post op Latanoprost QHS OS, Brimonidine TID OD, Dorzolamide TID OD and Acetazolamide 500mg PO BID Taper Prednisolone TID x 1 wk, BID x 1 wk, QD x 1 wk then d/c

## 2023-02-11 NOTE — IMPRESSION/PLAN
Impression: Diagnosis: Primary open-angle glaucoma, bilateral, severe stage. Code: Q22.7912. Goal IOP below 20. 7/20 OCT 78 (7), 7/20  HVF OD [place]
S/P Micropulse OD Plan: Discussed diagnosis with patient. IOP stable. Recommend patient continue Diamox 500mg BID PO Dorzolamide TID OD, Brimonidine TID OD and Rocklatan QHS OD. 4 month IOP check. Attending Only

## 2023-02-13 ENCOUNTER — POST-OPERATIVE VISIT (OUTPATIENT)
Facility: LOCATION | Age: 73
End: 2023-02-13
Payer: MEDICARE

## 2023-02-13 DIAGNOSIS — Z48.810 ENCOUNTER FOR SURGICAL AFTERCARE FOLLOWING SURGERY ON A SENSE ORGAN: Primary | ICD-10-CM

## 2023-02-13 PROCEDURE — 99024 POSTOP FOLLOW-UP VISIT: CPT | Performed by: OPHTHALMOLOGY

## 2023-02-13 ASSESSMENT — INTRAOCULAR PRESSURE: OD: 14

## 2023-02-13 NOTE — IMPRESSION/PLAN
Impression: S/P Micropulse Laser OD - 32 Days. Encounter for surgical aftercare following surgery on a sense organ  Z48.810. Post operative instructions reviewed -medication reviewed Plan: Will continue to monitor. 1 month IOP check/post op Prednisolone QD, discussed if pain increases ok to instill BID Continue Brimonidine TID OD, Dorzolamide/Timolol TID OD, Latanoprost QHS OD and Acetazolamide 500mg PO BID

## 2023-05-08 ENCOUNTER — POST-OPERATIVE VISIT (OUTPATIENT)
Facility: LOCATION | Age: 73
End: 2023-05-08
Payer: MEDICARE

## 2023-05-08 DIAGNOSIS — Z48.810 ENCOUNTER FOR SURGICAL AFTERCARE FOLLOWING SURGERY ON A SENSE ORGAN: Primary | ICD-10-CM

## 2023-05-08 PROCEDURE — 99024 POSTOP FOLLOW-UP VISIT: CPT | Performed by: OPHTHALMOLOGY

## 2023-05-08 RX ORDER — PREDNISOLONE ACETATE 10 MG/ML
1 % SUSPENSION/ DROPS OPHTHALMIC
Qty: 5 | Refills: 0 | Status: ACTIVE
Start: 2023-05-08

## 2023-05-08 RX ORDER — DORZOLAMIDE HYDROCHLORIDE AND TIMOLOL MALEATE 20; 5 MG/ML; MG/ML
SOLUTION/ DROPS OPHTHALMIC
Qty: 15 | Refills: 3 | Status: ACTIVE
Start: 2023-05-08

## 2023-05-08 ASSESSMENT — INTRAOCULAR PRESSURE
OS: 10
OD: 10

## 2023-05-08 NOTE — IMPRESSION/PLAN
Impression: S/P Micropulse Laser OD - 116 Days. Encounter for surgical aftercare following surgery on a sense organ  Z48.810. Plan: IOP improved. Inflammation present. Re start PRED qday OD. Continue Brimonidine tid OD, Cosopt tid OD, Latanoprost qhs OD and Acteazolamide bid PO. Re check 4-6 weeks.

## 2023-06-20 ENCOUNTER — OFFICE VISIT (OUTPATIENT)
Facility: LOCATION | Age: 73
End: 2023-06-20
Payer: MEDICARE

## 2023-06-20 DIAGNOSIS — Z48.810 ENCOUNTER FOR SURGICAL AFTERCARE FOLLOWING SURGERY ON A SENSE ORGAN: Primary | ICD-10-CM

## 2023-06-20 PROCEDURE — 99213 OFFICE O/P EST LOW 20 MIN: CPT | Performed by: OPHTHALMOLOGY

## 2023-06-20 RX ORDER — ACETAZOLAMIDE 250 MG/1
250 MG TABLET ORAL
Qty: 120 | Refills: 0 | Status: ACTIVE
Start: 2023-06-20

## 2023-06-20 ASSESSMENT — INTRAOCULAR PRESSURE: OD: 10

## 2023-06-20 NOTE — IMPRESSION/PLAN
Impression: S/P Micropulse Laser OD - 116 Days. Encounter for surgical aftercare following surgery on a sense organ  Z48.810. Plan: IOP  holding  well  . Continue Brimonidine tid OD, Cosopt tid OD, Latanoprost qhs OD and Acetazolamide bid PO. 
Dilate, iop check, vft  2-3 months

## 2023-09-20 ENCOUNTER — OFFICE VISIT (OUTPATIENT)
Facility: LOCATION | Age: 73
End: 2023-09-20
Payer: MEDICARE

## 2023-09-20 DIAGNOSIS — H40.1133 PRIMARY OPEN-ANGLE GLAUCOMA, BILATERAL, SEVERE STAGE: Primary | ICD-10-CM

## 2023-09-20 DIAGNOSIS — Z48.810 ENCOUNTER FOR SURGICAL AFTERCARE FOLLOWING SURGERY ON A SENSE ORGAN: ICD-10-CM

## 2023-09-20 PROCEDURE — 99213 OFFICE O/P EST LOW 20 MIN: CPT | Performed by: OPHTHALMOLOGY

## 2023-09-20 PROCEDURE — 92083 EXTENDED VISUAL FIELD XM: CPT | Performed by: OPHTHALMOLOGY

## 2023-09-20 ASSESSMENT — INTRAOCULAR PRESSURE: OD: 15

## 2024-01-10 ENCOUNTER — OFFICE VISIT (OUTPATIENT)
Facility: LOCATION | Age: 74
End: 2024-01-10
Payer: MEDICARE

## 2024-01-10 DIAGNOSIS — H40.1133 PRIMARY OPEN-ANGLE GLAUCOMA, BILATERAL, SEVERE STAGE: Primary | ICD-10-CM

## 2024-01-10 PROCEDURE — 99213 OFFICE O/P EST LOW 20 MIN: CPT | Performed by: OPHTHALMOLOGY

## 2024-01-10 RX ORDER — BRIMONIDINE TARTRATE 2 MG/ML
0.2 % SOLUTION/ DROPS OPHTHALMIC
Qty: 15 | Refills: 5 | Status: ACTIVE
Start: 2024-01-10

## 2024-01-10 RX ORDER — LATANOPROST 50 UG/ML
0.005 % SOLUTION OPHTHALMIC
Qty: 7.5 | Refills: 3 | Status: ACTIVE
Start: 2024-01-10

## 2024-01-10 RX ORDER — ACETAZOLAMIDE 250 MG/1
250 MG TABLET ORAL
Qty: 120 | Refills: 0 | Status: ACTIVE
Start: 2024-01-10

## 2024-01-10 RX ORDER — DORZOLAMIDE HYDROCHLORIDE AND TIMOLOL MALEATE 20; 5 MG/ML; MG/ML
SOLUTION/ DROPS OPHTHALMIC
Qty: 15 | Refills: 3 | Status: ACTIVE
Start: 2024-01-10

## 2024-01-10 RX ORDER — PREDNISOLONE ACETATE 10 MG/ML
1 % SUSPENSION/ DROPS OPHTHALMIC
Qty: 5 | Refills: 0 | Status: ACTIVE
Start: 2024-01-10

## 2024-01-10 ASSESSMENT — INTRAOCULAR PRESSURE
OS: 10
OD: 13

## 2024-04-17 ENCOUNTER — OFFICE VISIT (OUTPATIENT)
Facility: LOCATION | Age: 74
End: 2024-04-17
Payer: MEDICARE

## 2024-04-17 DIAGNOSIS — H40.1133 PRIMARY OPEN-ANGLE GLAUCOMA, BILATERAL, SEVERE STAGE: Primary | ICD-10-CM

## 2024-04-17 PROCEDURE — 99213 OFFICE O/P EST LOW 20 MIN: CPT | Performed by: OPHTHALMOLOGY

## 2024-04-17 ASSESSMENT — INTRAOCULAR PRESSURE: OD: 14

## 2024-08-21 ENCOUNTER — OFFICE VISIT (OUTPATIENT)
Facility: LOCATION | Age: 74
End: 2024-08-21
Payer: MEDICARE

## 2024-08-21 DIAGNOSIS — H04.123 DRY EYE SYNDROME OF BILATERAL LACRIMAL GLANDS: ICD-10-CM

## 2024-08-21 DIAGNOSIS — H40.1133 PRIMARY OPEN-ANGLE GLAUCOMA, BILATERAL, SEVERE STAGE: Primary | ICD-10-CM

## 2024-08-21 PROCEDURE — 99213 OFFICE O/P EST LOW 20 MIN: CPT | Performed by: OPHTHALMOLOGY

## 2024-08-21 RX ORDER — ACETAZOLAMIDE 250 MG/1
250 MG TABLET ORAL
Qty: 120 | Refills: 5 | Status: ACTIVE
Start: 2024-08-21

## 2024-08-21 ASSESSMENT — INTRAOCULAR PRESSURE: OD: 14

## 2024-10-04 ENCOUNTER — OFFICE VISIT (OUTPATIENT)
Facility: LOCATION | Age: 74
End: 2024-10-04
Payer: MEDICARE

## 2024-10-04 DIAGNOSIS — H16.001 CORNEAL ULCER OF RIGHT EYE: Primary | ICD-10-CM

## 2024-10-04 PROCEDURE — 99214 OFFICE O/P EST MOD 30 MIN: CPT

## 2024-10-04 RX ORDER — GENTAMICIN SULFATE 3 MG/ML
0.3 % SOLUTION/ DROPS OPHTHALMIC
Qty: 5 | Refills: 0 | Status: ACTIVE
Start: 2024-10-04

## 2024-10-04 ASSESSMENT — VISUAL ACUITY: OD: 20/150

## 2024-10-04 ASSESSMENT — INTRAOCULAR PRESSURE: OD: 12

## 2024-10-08 ENCOUNTER — OFFICE VISIT (OUTPATIENT)
Dept: URBAN - METROPOLITAN AREA CLINIC 32 | Facility: CLINIC | Age: 74
End: 2024-10-08
Payer: MEDICARE

## 2024-10-08 DIAGNOSIS — H40.1133 PRIMARY OPEN-ANGLE GLAUCOMA, BILATERAL, SEVERE STAGE: ICD-10-CM

## 2024-10-08 PROCEDURE — 99213 OFFICE O/P EST LOW 20 MIN: CPT | Performed by: OPHTHALMOLOGY

## 2024-10-08 RX ORDER — OFLOXACIN 3 MG/ML
0.3 % SOLUTION/ DROPS OPHTHALMIC
Qty: 5 | Refills: 3 | Status: ACTIVE
Start: 2024-10-08

## 2024-10-08 RX ORDER — GENTAMICIN SULFATE 3 MG/ML
0.3 % SOLUTION/ DROPS OPHTHALMIC
Qty: 5 | Refills: 6 | Status: ACTIVE
Start: 2024-10-08

## 2024-10-11 ENCOUNTER — OFFICE VISIT (OUTPATIENT)
Dept: URBAN - METROPOLITAN AREA CLINIC 33 | Facility: CLINIC | Age: 74
End: 2024-10-11
Payer: MEDICARE

## 2024-10-11 DIAGNOSIS — H16.001 CORNEAL ULCER OF RIGHT EYE: Primary | ICD-10-CM

## 2024-10-11 PROCEDURE — 99213 OFFICE O/P EST LOW 20 MIN: CPT | Performed by: OPHTHALMOLOGY

## 2024-10-11 RX ORDER — PREDNISOLONE ACETATE 10 MG/ML
1 % SUSPENSION/ DROPS OPHTHALMIC
Qty: 10 | Refills: 1 | Status: ACTIVE
Start: 2024-10-11

## 2024-10-11 ASSESSMENT — INTRAOCULAR PRESSURE
OS: 10
OD: 8

## 2024-10-16 ENCOUNTER — OFFICE VISIT (OUTPATIENT)
Dept: URBAN - METROPOLITAN AREA CLINIC 33 | Facility: CLINIC | Age: 74
End: 2024-10-16
Payer: MEDICARE

## 2024-10-16 DIAGNOSIS — H16.001 CORNEAL ULCER OF RIGHT EYE: ICD-10-CM

## 2024-10-16 DIAGNOSIS — H40.1133 PRIMARY OPEN-ANGLE GLAUCOMA, BILATERAL, SEVERE STAGE: Primary | ICD-10-CM

## 2024-10-16 PROCEDURE — 99213 OFFICE O/P EST LOW 20 MIN: CPT | Performed by: OPHTHALMOLOGY

## 2024-10-16 RX ORDER — PREDNISOLONE ACETATE 10 MG/ML
1 % SUSPENSION/ DROPS OPHTHALMIC
Qty: 10 | Refills: 5 | Status: ACTIVE
Start: 2024-10-16

## 2024-10-16 RX ORDER — GENTAMICIN SULFATE 3 MG/ML
0.3 % SOLUTION/ DROPS OPHTHALMIC
Qty: 5 | Refills: 6 | Status: ACTIVE
Start: 2024-10-16

## 2024-10-16 RX ORDER — OFLOXACIN 3 MG/ML
0.3 % SOLUTION/ DROPS OPHTHALMIC
Qty: 5 | Refills: 5 | Status: ACTIVE
Start: 2024-10-16

## 2024-10-16 ASSESSMENT — INTRAOCULAR PRESSURE
OS: 3
OD: 7

## 2024-12-06 ENCOUNTER — OFFICE VISIT (OUTPATIENT)
Dept: URBAN - METROPOLITAN AREA CLINIC 33 | Facility: CLINIC | Age: 74
End: 2024-12-06
Payer: MEDICARE

## 2024-12-06 DIAGNOSIS — H16.001 CORNEAL ULCER OF RIGHT EYE: Primary | ICD-10-CM

## 2024-12-06 DIAGNOSIS — H40.1133 PRIMARY OPEN-ANGLE GLAUCOMA, BILATERAL, SEVERE STAGE: ICD-10-CM

## 2024-12-06 PROCEDURE — 99213 OFFICE O/P EST LOW 20 MIN: CPT | Performed by: OPHTHALMOLOGY

## 2024-12-06 ASSESSMENT — INTRAOCULAR PRESSURE
OS: 9
OD: 9

## 2024-12-16 ENCOUNTER — OFFICE VISIT (OUTPATIENT)
Facility: LOCATION | Age: 74
End: 2024-12-16
Payer: MEDICARE

## 2024-12-16 DIAGNOSIS — H16.001 CORNEAL ULCER OF RIGHT EYE: ICD-10-CM

## 2024-12-16 DIAGNOSIS — H04.123 DRY EYE SYNDROME OF BILATERAL LACRIMAL GLANDS: ICD-10-CM

## 2024-12-16 DIAGNOSIS — H40.1133 PRIMARY OPEN-ANGLE GLAUCOMA, BILATERAL, SEVERE STAGE: Primary | ICD-10-CM

## 2024-12-16 PROCEDURE — 99213 OFFICE O/P EST LOW 20 MIN: CPT | Performed by: OPHTHALMOLOGY

## 2024-12-16 RX ORDER — DORZOLAMIDE HYDROCHLORIDE AND TIMOLOL MALEATE 20; 5 MG/ML; MG/ML
SOLUTION/ DROPS OPHTHALMIC
Qty: 15 | Refills: 3 | Status: ACTIVE
Start: 2024-12-16

## 2024-12-16 RX ORDER — BRIMONIDINE TARTRATE 2 MG/ML
0.2 % SOLUTION/ DROPS OPHTHALMIC
Qty: 15 | Refills: 3 | Status: ACTIVE
Start: 2024-12-16

## 2024-12-16 ASSESSMENT — INTRAOCULAR PRESSURE
OS: 10
OD: 18

## 2025-02-19 ENCOUNTER — OFFICE VISIT (OUTPATIENT)
Facility: LOCATION | Age: 75
End: 2025-02-19
Payer: MEDICARE

## 2025-02-19 DIAGNOSIS — H16.001 CORNEAL ULCER OF RIGHT EYE: ICD-10-CM

## 2025-02-19 DIAGNOSIS — H04.123 DRY EYE SYNDROME OF BILATERAL LACRIMAL GLANDS: ICD-10-CM

## 2025-02-19 DIAGNOSIS — H40.1133 PRIMARY OPEN-ANGLE GLAUCOMA, BILATERAL, SEVERE STAGE: Primary | ICD-10-CM

## 2025-02-19 PROCEDURE — 99213 OFFICE O/P EST LOW 20 MIN: CPT | Performed by: OPHTHALMOLOGY

## 2025-02-19 RX ORDER — LATANOPROST 50 UG/ML
0.005 % SOLUTION OPHTHALMIC
Qty: 7.5 | Refills: 3 | Status: ACTIVE
Start: 2025-02-19

## 2025-02-19 RX ORDER — BRIMONIDINE TARTRATE 2 MG/ML
0.2 % SOLUTION/ DROPS OPHTHALMIC
Qty: 15 | Refills: 3 | Status: ACTIVE
Start: 2025-02-19

## 2025-02-19 RX ORDER — PREDNISOLONE ACETATE 10 MG/ML
1 % SUSPENSION/ DROPS OPHTHALMIC
Qty: 10 | Refills: 1 | Status: ACTIVE
Start: 2025-02-19

## 2025-02-19 RX ORDER — DORZOLAMIDE HYDROCHLORIDE AND TIMOLOL MALEATE 20; 5 MG/ML; MG/ML
SOLUTION/ DROPS OPHTHALMIC
Qty: 15 | Refills: 3 | Status: ACTIVE
Start: 2025-02-19

## 2025-02-19 RX ORDER — ACETAZOLAMIDE 250 MG/1
250 MG TABLET ORAL
Qty: 120 | Refills: 5 | Status: ACTIVE
Start: 2025-02-19

## 2025-02-19 ASSESSMENT — INTRAOCULAR PRESSURE: OD: 18

## 2025-04-16 ENCOUNTER — OFFICE VISIT (OUTPATIENT)
Facility: LOCATION | Age: 75
End: 2025-04-16
Payer: MEDICARE

## 2025-04-16 DIAGNOSIS — H04.123 DRY EYE SYNDROME OF BILATERAL LACRIMAL GLANDS: ICD-10-CM

## 2025-04-16 DIAGNOSIS — H40.1133 PRIMARY OPEN-ANGLE GLAUCOMA, BILATERAL, SEVERE STAGE: Primary | ICD-10-CM

## 2025-04-16 DIAGNOSIS — H16.001 CORNEAL ULCER OF RIGHT EYE: ICD-10-CM

## 2025-04-16 PROCEDURE — 92133 CPTRZD OPH DX IMG PST SGM ON: CPT | Performed by: OPHTHALMOLOGY

## 2025-04-16 PROCEDURE — 99213 OFFICE O/P EST LOW 20 MIN: CPT | Performed by: OPHTHALMOLOGY

## 2025-04-16 PROCEDURE — 92083 EXTENDED VISUAL FIELD XM: CPT | Performed by: OPHTHALMOLOGY

## 2025-04-16 ASSESSMENT — INTRAOCULAR PRESSURE
OD: 16
OS: 13

## 2025-07-14 ENCOUNTER — OFFICE VISIT (OUTPATIENT)
Facility: LOCATION | Age: 75
End: 2025-07-14
Payer: MEDICARE

## 2025-07-14 DIAGNOSIS — H40.1133 PRIMARY OPEN-ANGLE GLAUCOMA, BILATERAL, SEVERE STAGE: Primary | ICD-10-CM

## 2025-07-14 DIAGNOSIS — H04.123 DRY EYE SYNDROME OF BILATERAL LACRIMAL GLANDS: ICD-10-CM

## 2025-07-14 DIAGNOSIS — H16.001 CORNEAL ULCER OF RIGHT EYE: ICD-10-CM

## 2025-07-14 PROCEDURE — 99213 OFFICE O/P EST LOW 20 MIN: CPT | Performed by: OPHTHALMOLOGY

## 2025-07-14 RX ORDER — ACETAZOLAMIDE 250 MG/1
250 MG TABLET ORAL
Qty: 120 | Refills: 5 | Status: ACTIVE
Start: 2025-07-14

## 2025-07-14 RX ORDER — LATANOPROST 50 UG/ML
0.005 % SOLUTION OPHTHALMIC
Qty: 7.5 | Refills: 3 | Status: ACTIVE
Start: 2025-07-14

## 2025-07-14 RX ORDER — BRIMONIDINE TARTRATE 2 MG/ML
0.2 % SOLUTION/ DROPS OPHTHALMIC
Qty: 15 | Refills: 5 | Status: ACTIVE
Start: 2025-07-14

## 2025-07-14 RX ORDER — DORZOLAMIDE HYDROCHLORIDE AND TIMOLOL MALEATE 20; 5 MG/ML; MG/ML
SOLUTION/ DROPS OPHTHALMIC
Qty: 15 | Refills: 3 | Status: ACTIVE
Start: 2025-07-14

## 2025-07-14 ASSESSMENT — INTRAOCULAR PRESSURE
OS: 12
OD: 19

## 2025-07-20 NOTE — IMPRESSION/PLAN
Impression: Diagnosis: Primary open-angle glaucoma, bilateral, severe stage. Code: B37.3918. Goal IOP below 20. 7/22 OCT 73/- 4/-, 7/22 HVF OD [place]/SD 
S/P Micropulse OD Plan: Discussed diagnosis with patient. Recommend patient continue Diamox 500mg BID PO, Dorzolamide TID OD, Brimonidine TID OD and Latanoprost QHS OD. Will continue to monitor. Previously Declined (within the last year)

## 2025-07-29 ENCOUNTER — OFFICE VISIT (OUTPATIENT)
Dept: URBAN - METROPOLITAN AREA CLINIC 32 | Facility: CLINIC | Age: 75
End: 2025-07-29
Payer: MEDICARE

## 2025-07-29 DIAGNOSIS — H40.1133 PRIMARY OPEN-ANGLE GLAUCOMA, BILATERAL, SEVERE STAGE: ICD-10-CM

## 2025-07-29 DIAGNOSIS — H16.001 CORNEAL ULCER OF RIGHT EYE: Primary | ICD-10-CM

## 2025-07-29 PROCEDURE — 99213 OFFICE O/P EST LOW 20 MIN: CPT | Performed by: OPHTHALMOLOGY

## 2025-07-29 ASSESSMENT — INTRAOCULAR PRESSURE
OS: 13
OD: 18